# Patient Record
Sex: MALE
[De-identification: names, ages, dates, MRNs, and addresses within clinical notes are randomized per-mention and may not be internally consistent; named-entity substitution may affect disease eponyms.]

---

## 2017-08-18 PROBLEM — Z00.00 ENCOUNTER FOR PREVENTIVE HEALTH EXAMINATION: Status: ACTIVE | Noted: 2017-08-18

## 2017-10-12 ENCOUNTER — APPOINTMENT (OUTPATIENT)
Dept: ORTHOPEDIC SURGERY | Facility: CLINIC | Age: 22
End: 2017-10-12

## 2021-10-01 ENCOUNTER — APPOINTMENT (OUTPATIENT)
Dept: GASTROENTEROLOGY | Facility: CLINIC | Age: 26
End: 2021-10-01
Payer: MEDICAID

## 2021-10-01 ENCOUNTER — NON-APPOINTMENT (OUTPATIENT)
Age: 26
End: 2021-10-01

## 2021-10-01 VITALS
HEART RATE: 83 BPM | SYSTOLIC BLOOD PRESSURE: 120 MMHG | OXYGEN SATURATION: 98 % | RESPIRATION RATE: 18 BRPM | HEIGHT: 72 IN | WEIGHT: 260 LBS | DIASTOLIC BLOOD PRESSURE: 80 MMHG | TEMPERATURE: 98 F | BODY MASS INDEX: 35.21 KG/M2

## 2021-10-01 DIAGNOSIS — R13.10 DYSPHAGIA, UNSPECIFIED: ICD-10-CM

## 2021-10-01 DIAGNOSIS — K21.9 GASTRO-ESOPHAGEAL REFLUX DISEASE W/OUT ESOPHAGITIS: ICD-10-CM

## 2021-10-01 PROCEDURE — 99204 OFFICE O/P NEW MOD 45 MIN: CPT

## 2021-10-01 RX ORDER — FAMOTIDINE 20 MG/1
20 TABLET, FILM COATED ORAL TWICE DAILY
Qty: 60 | Refills: 5 | Status: ACTIVE | COMMUNITY
Start: 2021-10-01 | End: 1900-01-01

## 2021-10-01 NOTE — ASSESSMENT
[FreeTextEntry1] : 26-year-old male acid reflux, dysphagia cannot rule out esophagitis\par \par Plan\par Recommendation for weight loss\par Recommendation for cessation of smoking and nicotine vape\par Indications risks benefits and alternatives to upper endoscopy reviewed\par Patient agreeable to examination\par Pepcid 20 mg twice daily\par \par Patient referred by Dr. Reinaldo Barrera

## 2021-10-01 NOTE — HISTORY OF PRESENT ILLNESS
[de-identified] : 26-year-old male chronic complaints of acid reflux\par Worse with tomato sauce, worse with citrus\par Patient is a smoker, also use nicotine vape\par Fluctuating weight, currently heavy\par Typical complaints of postprandial burning in the chest\par No improvement with Tums\par Some recent improvement with as needed Pepcid over-the-counter\par Patient also recounts several episodes of the years of solid food dysphagia, sticking sensation, epigastric pain with typically hard or solid food such as carrot, apple but also can recall an episode with rice\par \par Social history smoker

## 2021-12-15 ENCOUNTER — APPOINTMENT (OUTPATIENT)
Dept: GASTROENTEROLOGY | Facility: AMBULATORY MEDICAL SERVICES | Age: 26
End: 2021-12-15